# Patient Record
Sex: MALE | Race: OTHER | HISPANIC OR LATINO | URBAN - METROPOLITAN AREA
[De-identification: names, ages, dates, MRNs, and addresses within clinical notes are randomized per-mention and may not be internally consistent; named-entity substitution may affect disease eponyms.]

---

## 2021-05-15 ENCOUNTER — EMERGENCY (EMERGENCY)
Facility: HOSPITAL | Age: 31
LOS: 1 days | Discharge: HOME | End: 2021-05-15
Attending: EMERGENCY MEDICINE | Admitting: EMERGENCY MEDICINE
Payer: MEDICAID

## 2021-05-15 VITALS
WEIGHT: 291.89 LBS | DIASTOLIC BLOOD PRESSURE: 90 MMHG | SYSTOLIC BLOOD PRESSURE: 149 MMHG | OXYGEN SATURATION: 97 % | TEMPERATURE: 98 F | HEART RATE: 80 BPM | RESPIRATION RATE: 20 BRPM

## 2021-05-15 PROCEDURE — 99284 EMERGENCY DEPT VISIT MOD MDM: CPT

## 2021-05-15 RX ORDER — DIAZEPAM 5 MG
1 TABLET ORAL
Qty: 6 | Refills: 0
Start: 2021-05-15

## 2021-05-15 RX ORDER — IBUPROFEN 200 MG
1 TABLET ORAL
Qty: 10 | Refills: 0
Start: 2021-05-15

## 2021-05-15 RX ORDER — DIAZEPAM 5 MG
5 TABLET ORAL ONCE
Refills: 0 | Status: DISCONTINUED | OUTPATIENT
Start: 2021-05-15 | End: 2021-05-15

## 2021-05-15 RX ORDER — KETOROLAC TROMETHAMINE 30 MG/ML
30 SYRINGE (ML) INJECTION ONCE
Refills: 0 | Status: DISCONTINUED | OUTPATIENT
Start: 2021-05-15 | End: 2021-05-15

## 2021-05-15 RX ORDER — LIDOCAINE 4 G/100G
1 CREAM TOPICAL ONCE
Refills: 0 | Status: COMPLETED | OUTPATIENT
Start: 2021-05-15 | End: 2021-05-15

## 2021-05-15 RX ADMIN — LIDOCAINE 1 PATCH: 4 CREAM TOPICAL at 09:37

## 2021-05-15 RX ADMIN — Medication 30 MILLIGRAM(S): at 09:36

## 2021-05-15 RX ADMIN — Medication 5 MILLIGRAM(S): at 09:37

## 2021-05-15 NOTE — ED PROVIDER NOTE - CLINICAL SUMMARY MEDICAL DECISION MAKING FREE TEXT BOX
31 y/o male with no significant PMHx presents to the ER c/o 1.5 weeks of right sided back pain radiating down his right leg.  Pt reports similar pain in the past. Pt is well appearing, NAD, normal neuro exam, pain control, follow up PMD/Spine. Liliana att: 31 y/o male with no significant PMHx presents to the ER c/o 1.5 weeks of right sided back pain radiating down his right leg.  Pt reports similar pain in the past. Pt is well appearing, NAD, normal neuro exam, pain control, follow up PMD/Spine.

## 2021-05-15 NOTE — ED PROVIDER NOTE - PHYSICAL EXAMINATION
+right sided paraspinal TTP, NV intact, +SLR, strength 5/5, sensation equal and intact.  Pt ambulatory.

## 2021-05-15 NOTE — ED PROVIDER NOTE - NSFOLLOWUPINSTRUCTIONS_ED_ALL_ED_FT
Follow up with your Doctor in 1-2 days.    Follow up with Spine specialist in 1-2 days see attached list.  Heat to back.    Take Ibuprofen 600mg orally every 8 hours as needed for pain take with food.    Valium 5mg one tablet orally every 8 hours as needed for muscle spasm don't drive or drink alcohol while taking this medications.   Return to the ER for any persistent/worsening or new symptoms, weakness, numbness, difficulty urinating or any concerning symptoms.

## 2021-05-15 NOTE — ED ADULT NURSE NOTE - OBJECTIVE STATEMENT
Receive pt. in Intake room 10c alert and oriented x 4. presenting to the ER with complaints of lower back pain radiating to right buttocks and right lower leg. Pt. stated " the pain woke me up this morning and I am having difficulty walking". Medicated as ordered, right leg warm and mobile, positive pedal pulse. Will continue to monitor.

## 2021-05-15 NOTE — ED PROVIDER NOTE - PROGRESS NOTE DETAILS
DANITA Gold: pt feels better ambulating without assistance.  Discharge reviewed and discussed with patient.

## 2021-05-15 NOTE — ED ADULT NURSE NOTE - NSIMPLEMENTINTERV_GEN_ALL_ED
Implemented All Fall Risk Interventions:  [place] to call system. Call bell, personal items and telephone within reach. Instruct patient to call for assistance. Room bathroom lighting operational. Non-slip footwear when patient is off stretcher. Physically safe environment: no spills, clutter or unnecessary equipment. Stretcher in lowest position, wheels locked, appropriate side rails in place. Provide visual cue, wrist band, yellow gown, etc. Monitor gait and stability. Monitor for mental status changes and reorient to person, place, and time. Review medications for side effects contributing to fall risk. Reinforce activity limits and safety measures with patient and family.

## 2021-05-15 NOTE — ED ADULT NURSE NOTE - CHIEF COMPLAINT QUOTE
Patient Education     Viral Upper Respiratory Illness (Adult)    You have a viral upper respiratory illness (URI), which is another term for the common cold. This illness is contagious during the first few days. It is spread through the air by coughing and sneezing. It may also be spread by direct contact (touching the sick person and then touching your own eyes, nose, or mouth). Frequent handwashing will decrease risk of spread. Most viral illnesses go away within 7 to 10 days with rest and simple home remedies. Sometimes the illness may last for several weeks. Antibiotics will not kill a virus, and they are generally not prescribed for this condition.  Home care    If symptoms are severe, rest at home for the first 2 to 3 days. When you resume activity, don't let yourself get too tired.    Don't smoke. If you need help stopping, talk with your healthcare provider.    Avoid being exposed to cigarette smoke (yours or others ).    You may use acetaminophen or ibuprofen to control pain and fever. Aspirin should never be given to anyone under 18 years of age who is ill with a viral infection or fever. It may cause severe liver or brain damage.    Your appetite may be poor, so a light diet is fine. Stay well hydrated by drinking 6 to 8 glasses of fluids per day (water, soft drinks, juices, tea, or soup). Extra fluids will help loosen secretions in the nose and lungs.    Over-the-counter cold medicines will not shorten the length of time you re sick, but they may be helpful for the following symptoms: cough, sore throat, and nasal and sinus congestion. They may also suppress your immune system and illness may last longer.   Follow-up care  Follow up in the clinic in the next week to establish care.       Call 911  Call 911 if any of these occur:    Chest pain, shortness of breath, wheezing, or difficulty breathing    Coughing up blood    Very severe pain with swallowing, especially if it goes along with a muffled voice 
pt was awoken with right lower buttocks pain rad. down the leg.  diff. walking.

## 2021-05-15 NOTE — ED PROVIDER NOTE - OBJECTIVE STATEMENT
29 y/o male with no significant PMHx presents to the ER c/o 1.5 weeks of right sided back pain radiating down his right leg.  Pt reports similar pain in the past.  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  Pt denies fall, trauma, weakness, numbness, tingling. 29 y/o male with no significant PMHx presents to the ER c/o 1.5 weeks of right sided back pain radiating down his right leg.  Pt reports similar pain in the past.  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  Pt denies fall, trauma, weakness, numbness, tingling, fecal/urinary incontinence.

## 2021-05-15 NOTE — ED PROVIDER NOTE - PATIENT PORTAL LINK FT
You can access the FollowMyHealth Patient Portal offered by [facility] by registering at the following website: [email or web address]. By joining [employer]’s FollowMyHealth portal, you will also be able to view your health information using other applications (apps) compatible with our system.
